# Patient Record
(demographics unavailable — no encounter records)

---

## 2024-10-07 NOTE — HEALTH RISK ASSESSMENT
[HRA Reviewed] : Health risk assessment reviewed [Independent] : managing finances [Some assistance needed] : managing medications [Full assistance needed] : using transportation [No falls in past year] : Patient reported no falls in the past year

## 2024-10-07 NOTE — COUNSELING
[Overweight - ( BMI 25.1 - 29.9 )] : overweight -  ( BMI 25.1 - 29.9 ) [DASH diet recommended] : DASH diet recommended [Sodium restriction 2gm recommended] : sodium restriction 2 gm recommended [Continue diet as tolerated] : continue diet as tolerated based on goals of care [Non - Smoker] : non-smoker [Smoke/CO Detectors] : smoke/CO detectors [Use assistive device to avoid falls] : use assistive device to avoid falls [Remove clutter and unsafe carpeting to avoid falls] : remove clutter and unsafe carpeting to avoid falls [] : colonoscopy [Date: ___] : diabetic screening completed on [unfilled] [Improve mobility] : improve mobility [Decrease hospital use] : decrease hospital use [Minimize unnecessary interventions] : minimize unnecessary interventions [Maintain functional ability] : maintain functional ability [Patient/Caregiver has ___ understanding of disease process] : patient/caregiver has [unfilled] understanding of disease process [Completed Healthcare Proxy] : completed healthcare proxy [Completed Medical Orders for Life-Sustaining Treatment] : completed medical orders for life-sustaining treatment [Full Code] : Code Status: Full Code [No Limitations] : Treatment Guidelines: No limitations [Trial of Intubation] : Intubation: Trial of Intubation [[facility]: _____[facility]last [facility]: [unfilled] [_____] : HCP: [unfilled]

## 2024-10-07 NOTE — COUNSELING
[Overweight - ( BMI 25.1 - 29.9 )] : overweight -  ( BMI 25.1 - 29.9 ) [DASH diet recommended] : DASH diet recommended [Sodium restriction 2gm recommended] : sodium restriction 2 gm recommended [Continue diet as tolerated] : continue diet as tolerated based on goals of care [Non - Smoker] : non-smoker [Smoke/CO Detectors] : smoke/CO detectors [Use assistive device to avoid falls] : use assistive device to avoid falls [Remove clutter and unsafe carpeting to avoid falls] : remove clutter and unsafe carpeting to avoid falls [] : colonoscopy [Date: ___] : diabetic screening completed on [unfilled] [Improve mobility] : improve mobility [Decrease hospital use] : decrease hospital use [Minimize unnecessary interventions] : minimize unnecessary interventions [Maintain functional ability] : maintain functional ability [Patient/Caregiver has ___ understanding of disease process] : patient/caregiver has [unfilled] understanding of disease process [Completed Healthcare Proxy] : completed healthcare proxy [Completed Medical Orders for Life-Sustaining Treatment] : completed medical orders for life-sustaining treatment [Full Code] : Code Status: Full Code [No Limitations] : Treatment Guidelines: No limitations [Trial of Intubation] : Intubation: Trial of Intubation [[facility]: _____[facility]/last verification date: [unfilled] [_____] : HCP: [unfilled]

## 2024-10-08 NOTE — REASON FOR VISIT
[Follow-Up] : a follow-up visit [Spouse] : spouse [Pre-Visit Preparation] : pre-visit preparation was done [Intercurrent Specialty/Sub-specialty Visits] : the patient has intercurrent specialty/sub-specialty visits [FreeTextEntry2] : chart review  [FreeTextEntry3] : psych

## 2024-10-08 NOTE — REVIEW OF SYSTEMS
[Vision Problems] : vision problems [Lower Ext Edema] : lower extremity edema [Muscle Weakness] : muscle weakness [Joint Swelling] : joint swelling [Anxiety] : anxiety [Depression] : depression [Negative] : Heme/Lymph [FreeTextEntry3] : wears glasses  [de-identified] : chronic skin changes to BLE , left mold looking wound not open, unable to see derm

## 2024-10-08 NOTE — PHYSICAL EXAM
[No Acute Distress] : no acute distress [Normal Voice/Communication] : normal voice communication [Normal Sclera/Conjunctiva] : normal sclera/conjunctiva [PERRL] : pupils equal, round and reactive to light [Normal Outer Ear/Nose] : the ears and nose were normal in appearance [Supple] : the neck was supple [No Respiratory Distress] : no respiratory distress [Clear to Auscultation] : lungs were clear to auscultation bilaterally [Normal Rate] : heart rate was normal  [Normal Bowel Sounds] : normal bowel sounds [Non Tender] : non-tender [Soft] : abdomen soft [No CVA Tenderness] : no ~M costovertebral angle tenderness [No Rash] : no rash [Acne] : acne [Cranial Nerves Intact] : cranial nerves 2-12 were intact [No Motor Deficits] : the motor exam was normal [Oriented x3] : oriented to person, place, and time [Normal Affect] : the affect was normal [de-identified] : +2/edema to right lower extremities [de-identified] : generalized weakness  [de-identified] : Mid chest with red beefy mold noted, pt to follow up with Derm.

## 2024-10-08 NOTE — PHYSICAL EXAM
[No Acute Distress] : no acute distress [Normal Voice/Communication] : normal voice communication [Normal Sclera/Conjunctiva] : normal sclera/conjunctiva [PERRL] : pupils equal, round and reactive to light [Normal Outer Ear/Nose] : the ears and nose were normal in appearance [Supple] : the neck was supple [No Respiratory Distress] : no respiratory distress [Clear to Auscultation] : lungs were clear to auscultation bilaterally [Normal Rate] : heart rate was normal  [Normal Bowel Sounds] : normal bowel sounds [Non Tender] : non-tender [Soft] : abdomen soft [No CVA Tenderness] : no ~M costovertebral angle tenderness [No Rash] : no rash [Acne] : acne [Cranial Nerves Intact] : cranial nerves 2-12 were intact [No Motor Deficits] : the motor exam was normal [Oriented x3] : oriented to person, place, and time [Normal Affect] : the affect was normal [de-identified] : +2/edema to right lower extremities [de-identified] : generalized weakness  [de-identified] : Mid chest with red beefy mold noted, pt to follow up with Derm.

## 2024-10-08 NOTE — HISTORY OF PRESENT ILLNESS
[Patient is stable] : patient is stable [Patient] : patient [Spouse] : spouse [FreeTextEntry1] : compression fracture  [FreeTextEntry2] : PMH: 85 y/o male with history of COPD, HTN, Compression fx, depression. Patient is being seen today for routine follow-up of chronic conditions.  HPI/ROS reviewed with patient & wife. ****Left chest worn beefy red bleed at times, derm recommendation, Dermatologist referral recommended Unable to see Derm. Patient is afraid to come out of the house -Patient is a very pleasant man, seen sitting at kitchen table. Patient denies pain. He spends most of his day sitting at kitchen table, he reads the paper/watches t.v.  He is able to walk out to the kitchen in the morning, but his wife will wheel him back in the middle of the night.   -Patient reports fear of leaving the house combined with fear of falling - he does not trust his leg strength to hold him. Although he would like to walk again one day, he feels in his "heart of hearts" he will not walk like he used to. Refused PT/OT.    Right foot +2 edema  -Mood/Behavior - depressed. is followed by psychiatrist f/u by telehealth. Depressive episodes worsen - lost his brother - his best friend last year. He likes to laugh - he enjoys watching tv and looking out the window at the birds/trees/backyard. His wife is very active, and she gets out of the house every day.     Interval Events using Exederm for Eczema for now.  Subjective: -Appetite/weight: appetite good, weight good -Gait/falls: no falls, gait unsteady -Pain: No pain -Sleep: sleep well -BMs: 3x a week -Urine: no Issues -Skin: skin Intact, chest mold -DME: Rollator -Mood/memory: Mood depressed but fair at times, Memory good -Communication: conversational -Health Maintenance: UTD -Hospitalizations in the past year: None PCp Dr Hooks telehealth visit

## 2024-10-08 NOTE — HISTORY OF PRESENT ILLNESS
[Patient is stable] : patient is stable [Patient] : patient [Spouse] : spouse [FreeTextEntry1] : compression fracture  [FreeTextEntry2] : PMH: 83 y/o male with history of COPD, HTN, Compression fx, depression. Patient is being seen today for routine follow-up of chronic conditions.  HPI/ROS reviewed with patient & wife. ****Left chest worn beefy red bleed at times, derm recommendation, Dermatologist referral recommended Unable to see Derm. Patient is afraid to come out of the house -Patient is a very pleasant man, seen sitting at kitchen table. Patient denies pain. He spends most of his day sitting at kitchen table, he reads the paper/watches t.v.  He is able to walk out to the kitchen in the morning, but his wife will wheel him back in the middle of the night.   -Patient reports fear of leaving the house combined with fear of falling - he does not trust his leg strength to hold him. Although he would like to walk again one day, he feels in his "heart of hearts" he will not walk like he used to. Refused PT/OT.    Right foot +2 edema  -Mood/Behavior - depressed. is followed by psychiatrist f/u by telehealth. Depressive episodes worsen - lost his brother - his best friend last year. He likes to laugh - he enjoys watching tv and looking out the window at the birds/trees/backyard. His wife is very active, and she gets out of the house every day.     Interval Events using Exederm for Eczema for now.  Subjective: -Appetite/weight: appetite good, weight good -Gait/falls: no falls, gait unsteady -Pain: No pain -Sleep: sleep well -BMs: 3x a week -Urine: no Issues -Skin: skin Intact, chest mold -DME: Rollator -Mood/memory: Mood depressed but fair at times, Memory good -Communication: conversational -Health Maintenance: UTD -Hospitalizations in the past year: None PCp Dr Hooks telehealth visit

## 2024-10-08 NOTE — REVIEW OF SYSTEMS
[Vision Problems] : vision problems [Lower Ext Edema] : lower extremity edema [Muscle Weakness] : muscle weakness [Joint Swelling] : joint swelling [Anxiety] : anxiety [Depression] : depression [Negative] : Heme/Lymph [FreeTextEntry3] : wears glasses  [de-identified] : chronic skin changes to BLE , left mold looking wound not open, unable to see derm

## 2025-01-22 NOTE — COUNSELING
[Overweight - ( BMI 25.1 - 29.9 )] : overweight -  ( BMI 25.1 - 29.9 ) [DASH diet recommended] : DASH diet recommended [Sodium restriction 2gm recommended] : sodium restriction 2 gm recommended [Continue diet as tolerated] : continue diet as tolerated based on goals of care [Non - Smoker] : non-smoker [Smoke/CO Detectors] : smoke/CO detectors [Use assistive device to avoid falls] : use assistive device to avoid falls [Remove clutter and unsafe carpeting to avoid falls] : remove clutter and unsafe carpeting to avoid falls [] : abdominal aortic ultrasound [Date: ___] : diabetic screening completed on [unfilled] [Improve mobility] : improve mobility [Decrease hospital use] : decrease hospital use [Minimize unnecessary interventions] : minimize unnecessary interventions [Maintain functional ability] : maintain functional ability [Patient/Caregiver has ___ understanding of disease process] : patient/caregiver has [unfilled] understanding of disease process [Completed Healthcare Proxy] : completed healthcare proxy [Completed Medical Orders for Life-Sustaining Treatment] : completed medical orders for life-sustaining treatment [Full Code] : Code Status: Full Code [No Limitations] : Treatment Guidelines: No limitations [Trial of Intubation] : Intubation: Trial of Intubation [[facility]: _____[facility]last verification date: [unfilled] [_____] : HCP: [unfilled]

## 2025-01-22 NOTE — COUNSELING
[Overweight - ( BMI 25.1 - 29.9 )] : overweight -  ( BMI 25.1 - 29.9 ) [DASH diet recommended] : DASH diet recommended [Sodium restriction 2gm recommended] : sodium restriction 2 gm recommended [Continue diet as tolerated] : continue diet as tolerated based on goals of care [Non - Smoker] : non-smoker [Smoke/CO Detectors] : smoke/CO detectors [Use assistive device to avoid falls] : use assistive device to avoid falls [Remove clutter and unsafe carpeting to avoid falls] : remove clutter and unsafe carpeting to avoid falls [] : abdominal aortic ultrasound [Date: ___] : diabetic screening completed on [unfilled] [Improve mobility] : improve mobility [Decrease hospital use] : decrease hospital use [Minimize unnecessary interventions] : minimize unnecessary interventions [Maintain functional ability] : maintain functional ability [Patient/Caregiver has ___ understanding of disease process] : patient/caregiver has [unfilled] understanding of disease process [Completed Healthcare Proxy] : completed healthcare proxy [Completed Medical Orders for Life-Sustaining Treatment] : completed medical orders for life-sustaining treatment [Full Code] : Code Status: Full Code [No Limitations] : Treatment Guidelines: No limitations [Trial of Intubation] : Intubation: Trial of Intubation [[facility]: _____[facility]last verification date: [unfilled] [_____] : HCP: [unfilled]

## 2025-01-23 NOTE — REVIEW OF SYSTEMS
[Vision Problems] : vision problems [Lower Ext Edema] : lower extremity edema [Muscle Weakness] : muscle weakness [Joint Swelling] : joint swelling [Anxiety] : anxiety [Depression] : depression [Negative] : Heme/Lymph [FreeTextEntry3] : wears glasses  [de-identified] : chronic skin changes to BLE , left mold looking wound not open, unable to see derm

## 2025-01-23 NOTE — HISTORY OF PRESENT ILLNESS
[Patient is stable] : patient is stable [Patient] : patient [Spouse] : spouse [FreeTextEntry1] : compression fracture  [FreeTextEntry2] : PMH: 83 y/o male with history of COPD, HTN, Compression fx, depression. Patient is being seen today for routine follow-up of chronic conditions.  HPI/ROS reviewed with patient & wife. ****Left chest worn beefy red bleed at times, derm recommendation, Dermatologist referral recommended Unable to see Derm. Patient is afraid to come out of the house -Patient is a very pleasant man, seen sitting at kitchen table. Patient denies pain. He spends most of his day sitting at kitchen table, he reads the paper/watches t.v.  He is able to walk out to the kitchen in the morning, but his wife will wheel him back in the middle of the night.  Pt with severe Ezcema back , ears, stomach , arm will try dupexent -Patient reports fear of leaving the house combined with fear of falling - he does not trust his leg strength to hold him. Although he would like to walk again one day, he feels in his "heart of hearts" he will not walk like he used to. Refused PT/OT.    Right foot +2 edema  -Mood/Behavior - depressed. is followed by psychiatrist f/u by telehealth. Depressive episodes worsen - lost his brother - his best friend last year. He likes to laugh - he enjoys watching tv and looking out the window at the birds/trees/backyard. His wife is very active, and she gets out of the house every day.     Interval Events using Exederm for Eczema for now. During visit patient request to start on new Exzema meds, Pt educate on serious that this meds may have , Pt adamantly request to try the meds, Despite education on these meds, Pt state he read about it already already and want to give it a try. Pt declined referral to Dermatology as he is home bound and scare to go out.  Subjective: -Appetite/weight: appetite good, weight good -Gait/falls: no falls, gait unsteady -Pain: No pain -Sleep: sleep well -BMs: 3x a week -Urine: no Issues -Skin: skin Intact, chest mold -DME: Rollator -Mood/memory: Mood depressed but fair at times, Memory good -Communication: conversational -Health Maintenance: UTD -Hospitalizations in the past year: None PCp Dr Hooks telehealth visit

## 2025-01-23 NOTE — REVIEW OF SYSTEMS
[Vision Problems] : vision problems [Lower Ext Edema] : lower extremity edema [Muscle Weakness] : muscle weakness [Joint Swelling] : joint swelling [Anxiety] : anxiety [Depression] : depression [Negative] : Heme/Lymph [FreeTextEntry3] : wears glasses  [de-identified] : chronic skin changes to BLE , left mold looking wound not open, unable to see derm

## 2025-01-23 NOTE — PHYSICAL EXAM
[No Acute Distress] : no acute distress [Normal Voice/Communication] : normal voice communication [Normal Sclera/Conjunctiva] : normal sclera/conjunctiva [PERRL] : pupils equal, round and reactive to light [Normal Outer Ear/Nose] : the ears and nose were normal in appearance [Supple] : the neck was supple [No Respiratory Distress] : no respiratory distress [Clear to Auscultation] : lungs were clear to auscultation bilaterally [Normal Rate] : heart rate was normal  [Normal Bowel Sounds] : normal bowel sounds [Non Tender] : non-tender [Soft] : abdomen soft [No CVA Tenderness] : no ~M costovertebral angle tenderness [No Rash] : no rash [Acne] : acne [Cranial Nerves Intact] : cranial nerves 2-12 were intact [No Motor Deficits] : the motor exam was normal [Oriented x3] : oriented to person, place, and time [Normal Affect] : the affect was normal [de-identified] : +2/edema to right lower extremities [de-identified] : generalized weakness  [de-identified] : Mid chest with red beefy mold noted, pt to follow up with Derm.

## 2025-01-23 NOTE — PHYSICAL EXAM
[No Acute Distress] : no acute distress [Normal Voice/Communication] : normal voice communication [Normal Sclera/Conjunctiva] : normal sclera/conjunctiva [PERRL] : pupils equal, round and reactive to light [Normal Outer Ear/Nose] : the ears and nose were normal in appearance [Supple] : the neck was supple [No Respiratory Distress] : no respiratory distress [Clear to Auscultation] : lungs were clear to auscultation bilaterally [Normal Rate] : heart rate was normal  [Normal Bowel Sounds] : normal bowel sounds [Non Tender] : non-tender [Soft] : abdomen soft [No CVA Tenderness] : no ~M costovertebral angle tenderness [No Rash] : no rash [Acne] : acne [Cranial Nerves Intact] : cranial nerves 2-12 were intact [No Motor Deficits] : the motor exam was normal [Oriented x3] : oriented to person, place, and time [Normal Affect] : the affect was normal [de-identified] : +2/edema to right lower extremities [de-identified] : generalized weakness  [de-identified] : Mid chest with red beefy mold noted, pt to follow up with Derm.

## 2025-01-23 NOTE — HISTORY OF PRESENT ILLNESS
[Patient is stable] : patient is stable [Patient] : patient [Spouse] : spouse [FreeTextEntry1] : compression fracture  [FreeTextEntry2] : PMH: 85 y/o male with history of COPD, HTN, Compression fx, depression. Patient is being seen today for routine follow-up of chronic conditions.  HPI/ROS reviewed with patient & wife. ****Left chest worn beefy red bleed at times, derm recommendation, Dermatologist referral recommended Unable to see Derm. Patient is afraid to come out of the house -Patient is a very pleasant man, seen sitting at kitchen table. Patient denies pain. He spends most of his day sitting at kitchen table, he reads the paper/watches t.v.  He is able to walk out to the kitchen in the morning, but his wife will wheel him back in the middle of the night.  Pt with severe Ezcema back , ears, stomach , arm will try dupexent -Patient reports fear of leaving the house combined with fear of falling - he does not trust his leg strength to hold him. Although he would like to walk again one day, he feels in his "heart of hearts" he will not walk like he used to. Refused PT/OT.    Right foot +2 edema  -Mood/Behavior - depressed. is followed by psychiatrist f/u by telehealth. Depressive episodes worsen - lost his brother - his best friend last year. He likes to laugh - he enjoys watching tv and looking out the window at the birds/trees/backyard. His wife is very active, and she gets out of the house every day.     Interval Events using Exederm for Eczema for now. During visit patient request to start on new Exzema meds, Pt educate on serious that this meds may have , Pt adamantly request to try the meds, Despite education on these meds, Pt state he read about it already already and want to give it a try. Pt declined referral to Dermatology as he is home bound and scare to go out.  Subjective: -Appetite/weight: appetite good, weight good -Gait/falls: no falls, gait unsteady -Pain: No pain -Sleep: sleep well -BMs: 3x a week -Urine: no Issues -Skin: skin Intact, chest mold -DME: Rollator -Mood/memory: Mood depressed but fair at times, Memory good -Communication: conversational -Health Maintenance: UTD -Hospitalizations in the past year: None PCp Dr Hooks telehealth visit

## 2025-01-30 NOTE — HISTORY OF PRESENT ILLNESS
[Home] : at home, [unfilled] , at the time of the visit. [Other Location: e.g. Home (Enter Location, City,State)___] : at [unfilled] [Verbal consent obtained from patient] : the patient, [unfilled] [FreeTextEntry8] : 85 y M  COPD, HTN, HLD Compression fx, global weakness/deconditioning, depression.   CP visit after weakness while sitting in wheelchair.  Had weakness and tingling in b/l legs from sitting so long in wheelchair, felt like he didn't have strength, and could not stand.  CP called for lift assist and evaluation.  Able to lift patient to stand and moved patient to bed.  Patient had no complaints, no external signs of injury.   Separately, patient has chronic LE and anterior chest "wounds" that are being treated c OTC and bandages.  These wounds have been present for at least 1 yr.

## 2025-01-30 NOTE — PLAN
[FreeTextEntry1] : --Recommended HC team provide wound care RN for stasis ulcer --Recommended HC team provide urgent derm eval for chest wall mass as he will likely need a biopsy --May benefit from home PT for deconditioning

## 2025-01-30 NOTE — ASSESSMENT
[Assessment Only] : [facility] Outcome: Assessment Only [Home medication regimen/care plan changed and patient remained home] : Home medication regimen/care plan changed and patient remained home. [Yes] : If the Community Paramedicine evaluation had not been available would you have advised the patient to go to the ER?  Yes [FreeTextEntry2] : 147/68 86 98% 16 afebrile Lungs CTA LE 5/5 strength grossly b/l, no sensory deficits to gross touch L Chest wall -- 3cm circular mass, weeping, raised and appears friable; no surrounding erythema.  Well circumscribed.   R prox thigh -- ~6 cm diameter skin breakdown/stasis ulcer (seems like stage 2) without obvious surrounding erythema. [FreeTextEntry1] : Global weakness/deconditioning and subacute/chronic R thigh wound likely stasis ulcer as well as chronic chest wall mass not yet evaluated by derm

## 2025-02-01 NOTE — PLAN
[FreeTextEntry1] : 85yom with slip and landed softly on ground, unable to get up and CP arrived and helped pt up.  Declines ED visit. Wife to call next week again to request more home aid.

## 2025-02-01 NOTE — PLAN
[FreeTextEntry1] : 85 yom with frequent falls here with fall.  1.  House calls CP assisted patient to bed. 2.  Pt declined EMS transport. 3.  Wife requests more home services.  EMS informed family that she call call back if they change their minds and wish to go to the ED to be evaluated.

## 2025-02-01 NOTE — PHYSICAL EXAM
[No Acute Distress] : no acute distress [Soft] : abdomen soft [de-identified] : per EMS [de-identified] : no obvious deformity

## 2025-02-01 NOTE — PHYSICAL EXAM
[No Acute Distress] : no acute distress [No Respiratory Distress] : no respiratory distress  [Soft] : abdomen soft [de-identified] : per ems [de-identified] : no obvious signs of trauma

## 2025-02-01 NOTE — HISTORY OF PRESENT ILLNESS
[Home] : at home, [unfilled] , at the time of the visit. [Other Location: e.g. Home (Enter Location, City,State)___] : at [unfilled] [Other:____] : [unfilled] [FreeTextEntry3] : EMS [FreeTextEntry8] : 85 yom with multiple medical problems here with fall.  Wife called that the patient slipped off of his rollator and she can't pick him up.  Pt has no complaints and would not like to go to the hospital. Denies trauma

## 2025-02-01 NOTE — PHYSICAL EXAM
[No Acute Distress] : no acute distress [Soft] : abdomen soft [de-identified] : per EMS [de-identified] : no obvious deformity

## 2025-02-01 NOTE — HISTORY OF PRESENT ILLNESS
[Home] : at home, [unfilled] , at the time of the visit. [Other Location: e.g. Home (Enter Location, City,State)___] : at [unfilled] [Verbal consent obtained from patient] : the patient, [unfilled] [FreeTextEntry8] : 85 yom with slip from rollator and landed on ground.  Pt without trauma, but wife could not pick him up and patient too weak at baseline to get up.  Denies trauma or any other concerns.  ROS otherwise negative

## 2025-02-04 NOTE — ASSESSMENT
[FreeTextEntry2] : 147/77 82 94% 16, afebrile NCAT MMM Midline trachea Supple neck, no c/t/l spine TTP Abd soft NTND Lungs CTA b/l No deformities, no TTP at pelvis, no TTP at lower extremities B/L LE 4/5 grossly symmetrically [FreeTextEntry1] : Recurrent falls and progressive weakness.   Discussed possibility of admission for wound care and PT/SNF placement given recurrent falls but patient and family prefer to work with SW outpatient to treat at home

## 2025-02-04 NOTE — PLAN
[FreeTextEntry1] : Labs ordered to eval for electrolyte/thyroid disfunction Recommend PT referral to be placed by HC team Recommend neurology referral to be placed by HC team Family requesting SW assistance in arranging home health aide support Reinforced to patient and family that if deconditioning continued and he was unable to care for self then admission to hospital would be an appropriate option

## 2025-02-04 NOTE — HISTORY OF PRESENT ILLNESS
[FreeTextEntry8] : 85 y M 85 y M COPD, HTN, HLD Compression fx, global weakness/deconditioning, depression. Went to transfer from commode to bed, slid onto knees, not able to stand.  This is the third lift assist in the last week.  Family and patient are amenable to PT and SW to facilitate HHA Patient has no complaints, no pain, no paresthesias, only states he has been having persistently/progressing weakness in b/l LE over the last year; had been able to walk with a walker-chair up until several months ago when he had progressive weakness standing from his walker-chair Patinet is not on AC No changes to bowel/bladder function Seen by wound care but evidently visiting agent had no supplies Family and patient wish to remain at home

## 2025-04-25 NOTE — ADDENDUM
[FreeTextEntry1] :   Gin HINES assisted in documentation on 04/25/2025 acting as a scribe for NP Uma Archuleta.

## 2025-04-25 NOTE — END OF VISIT
[FreeTextEntry3] :   All medical record entries made by my scribe were at my, NP Uma Archuleta, direction and personally dictated by me. I have reviewed the chart and agree that the record accurately reflects my personal performance of the history, physical exam, assessment and plan. I have also personally directed, reviewed, and agreed with the chart.

## 2025-05-20 NOTE — ASSESSMENT
[FreeTextEntry1] : Chance Bustos 86 y/o male with PMHx of AFib, COPD, HTN, MI, hypercholesteremia, morbid obesity, compression fracture, and depression.  Patient is being seen today for TCM. Spouse accompanied patient during today's visit.  [] Today's Visit and Review - 5/13/2025 - AOx3, denies SOB, CP or dizziness, no changes in bladder and bowel functions. - Mucus heard in lungs on exam. Advised Mucinex and antibiotic sent to pharmacy. - 5 days of Cephaloxin remaining. - Patient to follow with in-home PT. Farhat (PT) arrived during visit. - HHA to attend 4 hours/day, 5 days/week. Patient reluctant to permit this but family believe this will be helpful. Encouraged by provider.  - Stitches removed during visit. once spot with stitch under the skin and bleeding. Steri Strips placed during visit. Bacitracin to be used on incision. Will come back to remove the last stitch next week - Medications reviewed and reconciled during visit.  [] removal of skin mass of chest/ Hospital discharge follow-up - s/p removal of skin mass on the chest, incision C/D/I except one spot was still open unable to remove the stitch due to bleeding from the attempt to remove the stitch - Will wait for another week for site to dry to remove the stitch next week - Advised to stop Keflex and start Doxycycline - can wash, with soap and water. may use bacitracin as needed  [] URI - START doxycycline 100mg bID x7days - START Mucinex 600mg BID x 5 days  We reviewed all medications at length with the patient/caregiver and addressed all questions. We discussed worsening symptoms with the patient /caregiver and verbalized understanding that there are no issues or concerns at this time. The patient/caregiver is encouraged to call the [facility] 24-hour number with any questions, concerns, or issues.

## 2025-05-20 NOTE — ASSESSMENT
[FreeTextEntry1] : Chance Bustos 84 y/o male with PMHx of AFib, COPD, HTN, MI, hypercholesteremia, morbid obesity, compression fracture, and depression.  Patient is being seen today for TCM. Spouse accompanied patient during today's visit.  [] Today's Visit and Review - 5/13/2025 - AOx3, denies SOB, CP or dizziness, no changes in bladder and bowel functions. - Mucus heard in lungs on exam. Advised Mucinex and antibiotic sent to pharmacy. - 5 days of Cephaloxin remaining. - Patient to follow with in-home PT. Farhat (PT) arrived during visit. - HHA to attend 4 hours/day, 5 days/week. Patient reluctant to permit this but family believe this will be helpful. Encouraged by provider.  - Stitches removed during visit. once spot with stitch under the skin and bleeding. Steri Strips placed during visit. Bacitracin to be used on incision. Will come back to remove the last stitch next week - Medications reviewed and reconciled during visit.  [] removal of skin mass of chest/ Hospital discharge follow-up - s/p removal of skin mass on the chest, incision C/D/I except one spot was still open unable to remove the stitch due to bleeding from the attempt to remove the stitch - Will wait for another week for site to dry to remove the stitch next week - Advised to stop Keflex and start Doxycycline - can wash, with soap and water. may use bacitracin as needed  [] URI - START doxycycline 100mg bID x7days - START Mucinex 600mg BID x 5 days  We reviewed all medications at length with the patient/caregiver and addressed all questions. We discussed worsening symptoms with the patient /caregiver and verbalized understanding that there are no issues or concerns at this time. The patient/caregiver is encouraged to call the [facility] 24-hour number with any questions, concerns, or issues.

## 2025-05-20 NOTE — REVIEW OF SYSTEMS
[Wheezing] : wheezing [Cough] : cough [Negative] : Heme/Lymph [de-identified] : chest with horizontal surgical incision with 7 stitches

## 2025-05-20 NOTE — REASON FOR VISIT
[Post-hospitalization from ___ Hospital] : Post-hospitalization from [unfilled] Hospital [Admitted on: ___] : The patient was admitted on [unfilled] [Discharged on ___] : discharged on [unfilled] [Discharge Summary] : discharge summary [Discharge Med List] : discharge medication list [Med Reconciliation] : medication reconciliation has been completed [Patient Contacted By: ____] : and contacted by [unfilled] [Pre-Visit Preparation] : Pre-visit preparation was done [Spouse] : spouse [FreeTextEntry2] : Patient was admitted to [facility] from 4/26/2025 to 5/1/2025 for removal of abnormal skin growth on his chest  [FreeTextEntry4] : chart review completed

## 2025-05-20 NOTE — REVIEW OF SYSTEMS
[Wheezing] : wheezing [Cough] : cough [Negative] : Heme/Lymph [de-identified] : chest with horizontal surgical incision with 7 stitches

## 2025-05-20 NOTE — END OF VISIT
[FreeTextEntry3] : All medical record entries made by the Scribe were at my direction and personally dictated by me. I have reviewed the chart and agree that the record accurately reflects my personal performance of the history, physical exam, assessment and plan. I have also personally directed, reviewed, and agreed with the chart. [Time Spent: ___ minutes] : I have spent [unfilled] minutes of time on the encounter which excludes teaching and separately reported services.

## 2025-05-20 NOTE — REASON FOR VISIT
[Post-hospitalization from ___ Hospital] : Post-hospitalization from [unfilled] Hospital [Admitted on: ___] : The patient was admitted on [unfilled] [Discharged on ___] : discharged on [unfilled] [Discharge Summary] : discharge summary [Discharge Med List] : discharge medication list [Med Reconciliation] : medication reconciliation has been completed [Patient Contacted By: ____] : and contacted by [unfilled] [Pre-Visit Preparation] : Pre-visit preparation was done [Spouse] : spouse [FreeTextEntry2] : Patient was admitted to [facility] from 4/26/2025 to 5/1/2025 for removal of abnormal skin growth on his chest  [FreeTextEntry4] : chart review completed

## 2025-05-20 NOTE — PHYSICAL EXAM
[No Acute Distress] : no acute distress [Normal Sclera/Conjunctiva] : normal sclera/conjunctiva [EOMI] : extra ocular movement intact [Normal Outer Ear/Nose] : the ears and nose were normal in appearance [Normal Oropharynx] : the oropharynx was normal [No Respiratory Distress] : no respiratory distress [No Accessory Muscle Use] : no accessory muscle use [Normal Rate] : heart rate was normal  [Regular Rhythm] : with a regular rhythm [Normal S1, S2] : normal S1 and S2 [No Murmurs] : no murmurs heard [No Edema] : there was no peripheral edema [Normal Bowel Sounds] : normal bowel sounds [Non Tender] : non-tender [Soft] : abdomen soft [Not Distended] : not distended [Normal Post Cervical Nodes] : no posterior cervical lymphadenopathy [Normal Anterior Cervical Nodes] : no anterior cervical lymphadenopathy [No CVA Tenderness] : no ~M costovertebral angle tenderness [No Spinal Tenderness] : no spinal tenderness [Normal Gait] : normal gait [Normal Strength/Tone] : muscle strength and tone were normal [No Rash] : no rash [Oriented x3] : oriented to person, place, and time [Normal Affect] : the affect was normal [de-identified] : congestion noted on ausculation [de-identified] : horizontal incision with 7 stitches, no redness or edema noted,

## 2025-05-20 NOTE — HISTORY OF PRESENT ILLNESS
[Patient] : patient [Family Member] : family member [FreeTextEntry1] : taxing effort to leave home due to multiple health conditions [FreeTextEntry2] : Chance Bustos 86 y/o male with PMHx of AFib, COPD, HTN, MI, hypercholesteremia, morbid obesity, compression fracture, and depression.  Patient is being seen today for TCM. Spouse accompanied patient during today's visit.  [] Today's Visit and Review - 5/13/2025 - AOx3, denies SOB, CP or dizziness, no changes in bladder and bowel functions. - Mucus heard in lungs on exam. Advised Mucinex and antibiotic sent to pharmacy. - 5 days of Cephaloxin remaining. - Patient to follow with in-home PT. Farhat (PT) arrived during visit. - HHA to attend 4 hours/day, 5 days/week. Patient reluctant to permit this but family believe this will be helpful. Encouraged by provider.  - Stitches removed during visit. once spot with stitch under the skin and bleeding. Steri Strips placed during visit. Bacitracin to be used on incision. Will come back to remove the last stitch next week - Medications reviewed and reconciled during visit.   # Ambulation: homebound, limited ambulation with walker # Cognition and memory: good # Mood: fair # Communication: good, responding appropriately # Appetite: no reported issues  <> Dysphagia: # Sleep hygiene: no reported issues. # BM pattern: stool is soft, no blood in it, # Urinary: denies increased frequency, pain with urination, change to color or odor of urine. # Skin: incision sutures removed # Sensory deficits: mildly [place], does not use hearing aids # Compliance:  #Social -  with two adult children. - Former smoker.  #DMEs - Walker - [employer]  Review of systems otherwise NEGATIVE. # Chronic disease:  # Specialists:

## 2025-05-20 NOTE — PHYSICAL EXAM
[No Acute Distress] : no acute distress [Normal Sclera/Conjunctiva] : normal sclera/conjunctiva [EOMI] : extra ocular movement intact [Normal Outer Ear/Nose] : the ears and nose were normal in appearance [Normal Oropharynx] : the oropharynx was normal [No Respiratory Distress] : no respiratory distress [No Accessory Muscle Use] : no accessory muscle use [Normal Rate] : heart rate was normal  [Regular Rhythm] : with a regular rhythm [Normal S1, S2] : normal S1 and S2 [No Murmurs] : no murmurs heard [No Edema] : there was no peripheral edema [Normal Bowel Sounds] : normal bowel sounds [Non Tender] : non-tender [Soft] : abdomen soft [Not Distended] : not distended [Normal Post Cervical Nodes] : no posterior cervical lymphadenopathy [Normal Anterior Cervical Nodes] : no anterior cervical lymphadenopathy [No CVA Tenderness] : no ~M costovertebral angle tenderness [No Spinal Tenderness] : no spinal tenderness [Normal Gait] : normal gait [Normal Strength/Tone] : muscle strength and tone were normal [No Rash] : no rash [Oriented x3] : oriented to person, place, and time [Normal Affect] : the affect was normal [de-identified] : congestion noted on ausculation [de-identified] : horizontal incision with 7 stitches, no redness or edema noted,

## 2025-07-22 NOTE — REASON FOR VISIT
[Follow-Up] : a follow-up visit [Pre-Visit Preparation] : pre-visit preparation was done [FreeTextEntry2] : chart review completed

## 2025-07-22 NOTE — HISTORY OF PRESENT ILLNESS
[Patient] : patient [Family Member] : family member [Patient is stable] : patient is stable [Education provided] : education provided [Spouse] : spouse [FreeTextEntry1] : taxing effort to leave home due to anxiety, bedbound, frequent falls.  [FreeTextEntry2] : Chance Bustos 84 y/o male with PMHx of AFib, COPD, HTN, MI, hypercholesteremia, morbid obesity, compression fracture, and depression. Patient is being seen for a routine follow-up for chronic diseases, first time by me.   ------------------ Spouse accompanied patient during today's visit.  [] Today's Visit and Review - 04/25/2025 - AOx3, denies SOB, CP or dizziness, no changes in bladder and bowel functions. - Ongoing issues with PT service for the past 3 months. Patient has not been seen by physical therapist due to limited PT availability. - Provider to switch PT service and consider inpatient rehabilitation if there are further delays in care. - Patient has been homebound by choice, didn't leave the house for more than 3 years.  - Patient fell inside the house while walking with a RW in Early Feb,and decided not to walk since then. Patient no longer able to get out of the bed, able to sit on the edge of the bed to eat. or to use commode. - Patient expressed notable fear of falling.  - Noted persistent chest wound which has been bleeding, started with a dime size growth which is now a size of 2 golf balls, protruding,  - chest lesion is presented with skin CA like feature.  -Provider recommended that patient present to hospital for further assessment of chest wound. Patient hesitant and expressed some anxiety, states he requires mental preparation before leaving the house. - Provider spoke with patient's daughter regarding chest wound and need for hospital presentation. Daughter agrees with POC - Medications reviewed and reconciled during visit.   [] ROS - Review of systems otherwise NEGATIVE. # Ambulation: homebound, limited ambulation with walker # Cognition and memory: good # Mood: fair, some anxiety # Communication: good, responding appropriately # Appetite: no reported issues  <> Dysphagia: no aspiration incidents # Sleep hygiene: no reported issues. # BM pattern: continent, stool is soft, no blood in it, # Urinary: continent, denies increased frequency, pain with urination, change to color or odor of urine. # Skin: non-healing bleeding chest wound, sores on lower extremities from walker. # Sensory deficits: wears eyeglasses # Compliance: overall compliant with meds  #Social -  with two adult children. - Former smoker.  #DMEs - Walker - [employer]

## 2025-07-22 NOTE — REVIEW OF SYSTEMS
[Negative] : Heme/Lymph [Fever] : no fever [Fatigue] : no fatigue [Chest Pain] : no chest pain [Palpitations] : no palpitations [Claudication] : no  leg claudication [Lower Ext Edema] : lower extremity edema [Orthopena] : no orthopnea [Shortness Of Breath] : no shortness of breath [Wheezing] : no wheezing [Cough] : no cough [Dyspnea on Exertion] : not dyspnea on exertion [Abdominal Pain] : no abdominal pain [Nausea] : no nausea [Constipation] : no constipation [Diarrhea] : no diarrhea [Dysuria] : no dysuria [Incontinence] : no incontinence [Hesitancy] : no hesitancy [Impotence] : no impotency [Joint Pain] : joint pain [Joint Stiffness] : joint stiffness [Muscle Pain] : no muscle pain [Muscle Weakness] : no muscle weakness [Back Pain] : no back pain [Joint Swelling] : no joint swelling [Itching] : no itching [Headache] : no headache [Dizziness] : no dizziness [Fainting] : no fainting [Confusion] : no confusion [Unsteady Walk] : ataxia [Memory Loss] : no memory loss [Suicidal] : not suicidal [Insomnia] : no insomnia [Anxiety] : anxiety [Depression] : no depression [FreeTextEntry9] : hyun knee pain and weakness [de-identified] : Skin growth on the chest

## 2025-07-22 NOTE — PHYSICAL EXAM
[No Acute Distress] : no acute distress [Normal Sclera/Conjunctiva] : normal sclera/conjunctiva [EOMI] : extra ocular movement intact [Normal Outer Ear/Nose] : the ears and nose were normal in appearance [Normal Oropharynx] : the oropharynx was normal [No Respiratory Distress] : no respiratory distress [No Accessory Muscle Use] : no accessory muscle use [Normal Rate] : heart rate was normal  [Regular Rhythm] : with a regular rhythm [Normal S1, S2] : normal S1 and S2 [No Murmurs] : no murmurs heard [No Edema] : there was no peripheral edema [Normal Bowel Sounds] : normal bowel sounds [Non Tender] : non-tender [Soft] : abdomen soft [Not Distended] : not distended [Normal Post Cervical Nodes] : no posterior cervical lymphadenopathy [Normal Anterior Cervical Nodes] : no anterior cervical lymphadenopathy [No CVA Tenderness] : no ~M costovertebral angle tenderness [No Spinal Tenderness] : no spinal tenderness [Normal Gait] : normal gait [Normal Strength/Tone] : muscle strength and tone were normal [No Rash] : no rash [Oriented x3] : oriented to person, place, and time [Normal Affect] : the affect was normal [Normal Voice/Communication] : normal voice communication [No JVD] : no jugular venous distention [Supple] : the neck was supple [Thyroid Normal, No Nodules] : the thyroid was normal and there were no nodules present [Clear to Auscultation] : lungs were clear to auscultation bilaterally [Kyphosis] : no kyphosis present [Cranial Nerves Intact] : cranial nerves 2-12 were intact [No Gross Sensory Deficits] : no gross sensory deficits [Normal Reflexes] : deep tendon reflexes were 2+ and symmetric [Normal Mood] : the mood was normal [Normal Insight/Judgement] : insight and judgment were intact [de-identified] : congestion noted on ausculation [de-identified] : Raimundo LE with trace of edema [de-identified] : 2 golf ball size mass bet the breasts over the sternum

## 2025-07-22 NOTE — HEALTH RISK ASSESSMENT
[HRA Reviewed] : Health risk assessment reviewed [FreeTextEntry8] : nonambulatory [Independent] : using telephone [Some assistance needed] : managing finances [Full assistance needed] : doing laundry [FreeTextEntry6] : unable to use transportation [Two or more falls in past year] : Patient reported two or more falls in the past year [Yes] : The patient does have visual impairment

## 2025-07-22 NOTE — ASSESSMENT
[FreeTextEntry1] : Chance Bustos 86 y/o male with PMHx of AFib, COPD, HTN, MI, hypercholesteremia, morbid obesity, compression fracture, and depression. Patient is being seen for a routine follow-up for chronic diseases. Spouse accompanied patient during today's visit.  [] Today's Visit and Review - 04/25/2025 - AOx3, denies SOB, CP or dizziness, no changes in bladder and bowel functions. - Ongoing issues with PT service for the past 3 months. Patient has not been seen by physical therapist due to limited PT availability. - Provider to switch PT service and consider inpatient rehabilitation if there are further delays in care. - Patient has been homebound by choice, didn't leave the house for more than 3 years.  - Patient fell inside the house while walking with a RW in Early Feb,and decided not to walk since then. Patient no longer able to get out of the bed, able to sit on the edge of the bed to eat. or to use commode. - Patient expressed notable fear of falling.  - Noted persistent chest wound which has been bleeding, started with a dime size growth which is now a size of 2 golf balls, protruding,  - chest lesion is presented with skin CA like feature.  -Provider recommended that patient present to hospital for further assessment of chest wound. Patient hesitant and expressed some anxiety, states he requires mental preparation before leaving the house. - Provider spoke with patient's daughter regarding chest wound and need for hospital presentation. Daughter agrees with POC - Medications reviewed and reconciled during visit.  [] Skin mass - chronic, growth over 3 years, progressively getting bigger, non-healing - Hospital transfer recommended as patient is unable to leave the house and follow up in the community. Patinet and family agreed with POC  [] iliac artery aneurysm - chronic, on history,  - continue Pravastatin, ASA 81mg   [] COPD/ lung granuloma - chronic, stable - continue Spiriva, Albuterol inhalers  [] hyun LE edema - chronic, trace of edema, no open area - advised to elevate more and control sodium intake  [] HTN - chronic, mildly elevate BP on exam - continue Toprol XL  [] HLD - chronic,  - continue Pravastatin 10mg  [] impaired functional mobility, balance, gait and endurance/ hyun LE weakness - recurrent falls, no longer ambulating for the last 3 months, voluntary bedbound, now unable to get out of the bed. - Pending PT/ OT, will revisit when patient discharge from the hospital.   [] anxiety and depression - chronic, stable - continue clonazepam, Mirtazapine,  - clonazepam renewed  [] Care Coordination - Hospital transfer planned out. Patient needs more time for mental preparation so will call [facility] tomorrow to get EMS assistance.   We reviewed all medications at length with the patient/caregiver and addressed all questions. We discussed worsening symptoms with the patient /caregiver and verbalized understanding that there are no issues or concerns at this time. The patient/caregiver is encouraged to call the [facility] 24-hour number with any questions, concerns, or issues.

## 2025-07-22 NOTE — COUNSELING
[Obese -  ( BMI  >29.9 )] : obese - ( BMI  >29.9 ) [DASH diet recommended] : DASH diet recommended [Sodium restriction 2gm recommended] : sodium restriction 2 gm recommended [Medical/Nutritional supplementation as prescribed] : medical/nutritional supplementation as prescribed [Continue diet as tolerated] : continue diet as tolerated based on goals of care [Non - Smoker] : non-smoker [] : foot exam